# Patient Record
Sex: MALE | Race: WHITE | NOT HISPANIC OR LATINO | Employment: UNEMPLOYED | URBAN - METROPOLITAN AREA
[De-identification: names, ages, dates, MRNs, and addresses within clinical notes are randomized per-mention and may not be internally consistent; named-entity substitution may affect disease eponyms.]

---

## 2019-02-06 ENCOUNTER — OFFICE VISIT (OUTPATIENT)
Dept: PSYCHIATRY | Facility: CLINIC | Age: 59
End: 2019-02-06

## 2019-02-06 DIAGNOSIS — Z13.89 ENCOUNTER FOR SCREENING FOR OTHER DISORDER: ICD-10-CM

## 2019-02-06 DIAGNOSIS — F43.9 FEELING STRESSED OUT: ICD-10-CM

## 2019-02-06 DIAGNOSIS — G89.29 OTHER CHRONIC PAIN: Primary | ICD-10-CM

## 2019-02-06 PROCEDURE — 90791 PSYCH DIAGNOSTIC EVALUATION: CPT | Performed by: PSYCHOLOGIST

## 2019-02-07 NOTE — PROGRESS NOTES
PROGRESS NOTE    Data:  Shane Kearney is a 58 y.o. male who met with the undersigned for a scheduled individual outpatient therapy session from 2:20 - 3:15pm.      Clinical Maneuvering/Intervention:      Pt talked about struggling with chronic pain for years, having bad back pain as of late. He complained about how having to take pain medication for many years was upsetting to him and hard on his body. He is seeking to get the spinal cord stimulator (SCS) in hope that it could help alleviate pain symptoms. A psychological evaluation was conducted in order to assess past and current level of functioning. Areas assessed included, but were not limited to: perception of social support, perception of ability to face and deal with challenges in life (positive functioning), anxiety symptoms, depressive symptoms, perspective on beliefs/belief system, coping skills for stress, intelligence level, addiction issues, etc. Therapeutic rapport was established. Interventions conducted today were geared towards pain symptom management, identifying existing coping skills to use in face of difficulties, and identifying any potential counseling needs. He talked about how he lives with his son and granddaughter [place], that finances are short, but that they make ends meet the best that they can. 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 Core stressors included financial stress, having physical pain, and having concerns about his elderly parents (mother with dementia and father with late stage cancer). Coping skills were deemed strong as he has the ability to: relying on several people in his life when needed, be connected socially nearly daily, use humor in difficult situations, and pull himself out of feeling down/temporarily depressed. He talked about feeling more excited/hopeful about getting the SCS than nervous. The pt expressed gratitude for  today's session.    Mental Status Exam  Hygiene:  good  Dress: normal  Attitude:  cooperative and proactive  Motor Activity: normal  Speech: normal  Mood:  level  Affect:  congruent  Thought Processes: normal  Thought Content:  normal  Suicidal Thoughts:  not endorsed  Homicidal Thoughts:  not endorsed  Crisis Safety Plan: not needed   Hallucinations:  none      Patient's Support Network Includes:  family, friends      Progress toward goal: there is evidence to suggest that he is taking measures to improve the quality of his life including seeking pain treatment.      Functional Status: moderate      Prognosis: good    Assessment      The pt presented to be suffering from chronic pain and experiencing stress related to financial strain and having sick parents. He otherwise presents as a fairly highly functioning person with several strong coping skills for stress (as described above).    From a psychological standpoint, he is a good candidate to receive the SCS as he is motivated for the treatment, open to his pain physician’s suggestions/guidance, and will likely abide by his pain physician’s instructions.         Plan      In order to diminish symptoms of pain, the pt will follow up with his pain physician in order to receive the SCS (as soon as feasible/advisable by his physician). In order to prevent onset of depression/major depression, he is to continue taking depression medication as prescribed. Should pain symptoms diminish, he is to give thought to how he can increase his income in order to alleviate financial stress. He is to continue to lean on loved ones in order to cope with stress and seek counseling should he need/desire this in the future.    Melvina Ash, PhD, LP

## 2020-07-01 ENCOUNTER — LAB (OUTPATIENT)
Dept: LAB | Facility: HOSPITAL | Age: 60
End: 2020-07-01

## 2020-07-01 ENCOUNTER — OFFICE VISIT (OUTPATIENT)
Dept: NEUROLOGY | Facility: CLINIC | Age: 60
End: 2020-07-01

## 2020-07-01 VITALS
BODY MASS INDEX: 43.23 KG/M2 | OXYGEN SATURATION: 97 % | DIASTOLIC BLOOD PRESSURE: 78 MMHG | HEIGHT: 70 IN | WEIGHT: 302 LBS | SYSTOLIC BLOOD PRESSURE: 128 MMHG | HEART RATE: 63 BPM

## 2020-07-01 DIAGNOSIS — R73.03 PRE-DIABETES: ICD-10-CM

## 2020-07-01 DIAGNOSIS — R55 SYNCOPE AND COLLAPSE: Primary | ICD-10-CM

## 2020-07-01 LAB — HBA1C MFR BLD: 4.7 % (ref 4.8–5.6)

## 2020-07-01 PROCEDURE — 83036 HEMOGLOBIN GLYCOSYLATED A1C: CPT

## 2020-07-01 PROCEDURE — 99204 OFFICE O/P NEW MOD 45 MIN: CPT | Performed by: PSYCHIATRY & NEUROLOGY

## 2020-07-01 PROCEDURE — 36415 COLL VENOUS BLD VENIPUNCTURE: CPT

## 2020-07-01 RX ORDER — LISINOPRIL 40 MG/1
40 TABLET ORAL DAILY
COMMUNITY
Start: 2020-06-08 | End: 2020-08-25 | Stop reason: SDUPTHER

## 2020-07-01 RX ORDER — ESOMEPRAZOLE MAGNESIUM 40 MG/1
CAPSULE, DELAYED RELEASE ORAL
COMMUNITY

## 2020-07-01 RX ORDER — SERTRALINE HYDROCHLORIDE 100 MG/1
100 TABLET, FILM COATED ORAL DAILY
COMMUNITY

## 2020-07-01 RX ORDER — PREGABALIN 100 MG/1
250 CAPSULE ORAL 3 TIMES DAILY
COMMUNITY

## 2020-07-01 RX ORDER — NORTRIPTYLINE HYDROCHLORIDE 25 MG/1
25 CAPSULE ORAL DAILY
COMMUNITY

## 2020-07-01 RX ORDER — HYDROXYZINE 50 MG/1
50 TABLET, FILM COATED ORAL EVERY 6 HOURS PRN
COMMUNITY

## 2020-07-01 RX ORDER — PREGABALIN 150 MG/1
150 CAPSULE ORAL DAILY
COMMUNITY
Start: 2020-06-27 | End: 2020-08-25 | Stop reason: SDUPTHER

## 2020-07-01 RX ORDER — NAPROXEN 500 MG/1
500 TABLET ORAL DAILY
COMMUNITY

## 2020-07-01 RX ORDER — HYDROCODONE BITARTRATE AND ACETAMINOPHEN 7.5; 325 MG/1; MG/1
TABLET ORAL EVERY 6 HOURS
COMMUNITY
Start: 2020-06-22

## 2020-07-01 RX ORDER — TIZANIDINE 4 MG/1
4 TABLET ORAL DAILY
COMMUNITY

## 2020-07-01 NOTE — PROGRESS NOTES
Subjective:    CC: Shane Kearney is seen today in consultation at the request of THIERRY Reddy for syncope    HPI:  59-year-old male with a past medical history of depression, chronic back pain presents with 2 syncopal episodes.  As per patient he had his first syncopal episode 3 weeks ago.  He was out mowing his yard when he had tremulousness of the whole body with his legs feeling weak.  After that he passed out for a few seconds.  Denies any tongue bite, bowel/bladder incontinence.  The episode was witnessed by his coworkers who told him that he did not shake during the spell.  When he woke up he was slightly confused for a minute or so.  He had a second episode 3 days later again with whole body tremulousness however he sat down and did not pass out.  His third episode was 1 week ago again with mild whole body shaking/tremulousness followed by passing out for a few seconds.  This event was not witnessed by anyone.  Patient did not have any warning signs with any of the episodes.  He also gets lightheaded when standing up and walking.  Denies having any febrile seizures as a child or any family history of seizures.  He states he was previously a diabetic but went off of medications after he had a gastric bypass surgery and lost weight.  He had a CT scan of the head last month that did not show any acute intracranial abnormalities.    The following portions of the patient's history were reviewed today and updated as of 07/01/2020  : allergies, current medications, past family history, past medical history, past social history, past surgical history and problem list  These document will be scanned to patient's chart.      Current Outpatient Medications:   •  esomeprazole (nexIUM) 40 MG capsule, esomeprazole magnesium 40 mg capsule,delayed release, Disp: , Rfl:   •  HYDROcodone-acetaminophen (NORCO) 7.5-325 MG per tablet, , Disp: , Rfl:   •  hydrOXYzine (ATARAX) 50 MG tablet, Take 50 mg by mouth Daily.,  "Disp: , Rfl:   •  lisinopril (PRINIVIL,ZESTRIL) 40 MG tablet, Take 40 mg by mouth Daily., Disp: , Rfl:   •  naproxen (NAPROSYN) 500 MG tablet, Take 500 mg by mouth Daily., Disp: , Rfl:   •  nortriptyline (PAMELOR) 25 MG capsule, Take 25 mg by mouth Daily., Disp: , Rfl:   •  pregabalin (LYRICA) 150 MG capsule, Take 150 mg by mouth Daily., Disp: , Rfl:   •  sertraline (ZOLOFT) 100 MG tablet, Take 100 mg by mouth Daily., Disp: , Rfl:   •  tiZANidine (ZANAFLEX) 4 MG tablet, Take 4 mg by mouth Daily., Disp: , Rfl:   •  pregabalin (Lyrica) 100 MG capsule, Take 100 mg by mouth Daily., Disp: , Rfl:    Past Medical History:   Diagnosis Date   • Migraine    • Syncope       History reviewed. No pertinent surgical history.   History reviewed. No pertinent family history.   Social History     Socioeconomic History   • Marital status: Unknown     Spouse name: Not on file   • Number of children: Not on file   • Years of education: Not on file   • Highest education level: Not on file   Tobacco Use   • Smoking status: Never Smoker   • Smokeless tobacco: Never Used   Substance and Sexual Activity   • Alcohol use: Yes   • Drug use: Never   • Sexual activity: Defer     Review of Systems   Neurological: Positive for dizziness, tremors, syncope, weakness, numbness and headache.   All other systems reviewed and are negative.      Objective:    /78   Pulse 63   Ht 177.8 cm (70\")   Wt (!) 137 kg (302 lb)   SpO2 97%   BMI 43.33 kg/m²     Neurology Exam:    General apperance: Morbidly obese.  Orthostatics checked today were positive and showed a 20 point drop in systolic and diastolic blood pressure on standing up    Mental status: Alert, awake and oriented to time place and person.    Recent and Remote memory: Intact.    Attention span and Concentration: Normal.     Language and Speech: Intact- No dysarthria.    Fluency, Naming , Repitition and Comprehension:  Intact    Cranial Nerves:   CN II: Visual fields are full. Intact. " Fundi - Normal, No papillederma, Pupils - BRENDA  CN III, IV and VI: Extraocular movements are intact. Normal saccades.   CN V: Facial sensation is intact.   CN VII: Muscles of facial expression reveal no asymmetry. Intact.   CN VIII: Hearing is intact. Whispered voice intact.   CN IX and X: Palate elevates symmetrically. Intact  CN XI: Shoulder shrug is intact.   CN XII: Tongue is midline without evidence of atrophy or fasciculation.     Ophthalmoscopic exam of optic disc-normal    Motor:-Fine postural tremors noted in both hands    Right UE muscle strength 5/5. Normal tone.     Left UE muscle strength 5/5. Normal tone.      Right LE muscle strength5/5. Normal tone.     Left LE muscle strength 5/5. Normal tone.      Sensory: Normal light touch, vibration and pinprick sensation bilaterally.    DTRs: 2+ bilaterally in upper and lower extremities.    Babinski: Negative bilaterally.    Co-ordination: Normal finger-to-nose, heel to shin B/L.    Rhomberg: Negative.    Gait: Normal.    Cardiovascular: Regular rate and rhythm without murmur, gallop or rub.    Assessment and Plan:  1. Syncope and collapse  Patient's syncopal episodes were most likely due to orthostatic hypotension.  Orthostatics today were positive  I have asked him to monitor his blood pressure every day at home and to come off of lisinopril after discussing with his PCP if his blood pressure runs consistently low  He should also keep himself well-hydrated (something which she does not do) and start wearing above-knee moderate compression stockings for increased venous return  I will defer starting him on medications yet but if he continues to have episodes then I may start him on low doses of midodrine.  We also get a cardiac work-up if the episodes continue    2. Pre-diabetes  Will check an A1c as diabetes can predispose to autonomic dysfunction    - Hemoglobin A1c; Future       Return in about 6 weeks (around 8/12/2020).         Savanah Hawk MD

## 2020-07-02 ENCOUNTER — TELEPHONE (OUTPATIENT)
Dept: NEUROLOGY | Facility: CLINIC | Age: 60
End: 2020-07-02

## 2020-07-02 NOTE — TELEPHONE ENCOUNTER
Relayed results to  who stated verbal understanding. Also states he ordered his compression stockings last night so will see how they do. Thanks!

## 2020-07-02 NOTE — TELEPHONE ENCOUNTER
----- Message from Savanah Hawk MD sent at 7/2/2020  9:04 AM EDT -----  Please let him know that his A1c was normal in fact on the lower side.  In addition to keeping himself well-hydrated he should eat at regular intervals

## 2020-08-25 ENCOUNTER — CONSULT (OUTPATIENT)
Dept: CARDIOLOGY | Facility: CLINIC | Age: 60
End: 2020-08-25

## 2020-08-25 VITALS
OXYGEN SATURATION: 99 % | TEMPERATURE: 98.1 F | HEART RATE: 87 BPM | WEIGHT: 298 LBS | BODY MASS INDEX: 42.66 KG/M2 | HEIGHT: 70 IN | DIASTOLIC BLOOD PRESSURE: 68 MMHG | SYSTOLIC BLOOD PRESSURE: 108 MMHG

## 2020-08-25 DIAGNOSIS — R55 SYNCOPE AND COLLAPSE: Primary | ICD-10-CM

## 2020-08-25 PROCEDURE — 99204 OFFICE O/P NEW MOD 45 MIN: CPT | Performed by: INTERNAL MEDICINE

## 2020-08-25 PROCEDURE — 93000 ELECTROCARDIOGRAM COMPLETE: CPT | Performed by: INTERNAL MEDICINE

## 2020-08-25 RX ORDER — LISINOPRIL 40 MG/1
20 TABLET ORAL
Start: 2020-08-25

## 2020-08-25 NOTE — PROGRESS NOTES
[facility] at [facility]  Consultation H&P  Shane Kearney  1960    There is no work phone number on file..    VISIT DATE:  08/25/2020    PCP: Anjali Bernal PA  [address] [id number]    CC:  Chief Complaint   Patient presents with   • Loss of Consciousness     Consult    • Dizziness   • Hypertension       ASSESSMENT:   Diagnosis Plan   1. Syncope and collapse  Adult Transthoracic Echo Complete W/ Cont if Necessary Per Protocol    US Carotid Bilateral   Overall consistent with orthostatic hypotension.    PLAN:  Transthoracic echo pending to assess underlying myocardial structure and function.  Carotid duplex to evaluate for symptomatic carotid atherosclerosis.  Decreasing lisinopril to 20 mg and moving to bedtime dosing.  Discussed avoiding dehydration and taking abortive measures and being careful when getting up from a seated position.    History of Present Illness   60-year-old gentleman with history of hypertension he was recently had 5 episodes of syncope over the previous 4 to 6 weeks.  Most of these episodes occurred after standing up from a seated position.  Would have rapid onset lightheadedness eventually into a short loss of consciousness.  Has experienced soft tissue injuries with these falls.  No associated palpitations or chest discomfort.  No loss of bowel or bladder function.  No recent medication changes.  Weight is been stable.  Reports his blood pressures are often running less than 120/75 mmHg at home.  He is compliant with medical therapy.  Takes his lisinopril in the morning.  Recent laboratory evaluation remarkable for hemoglobin of 9 g/dL, creatinine 1.5.  Was previously evaluated by neurology with positive orthostatic blood pressures.  Low risk 12 EKG today.    PHYSICAL EXAMINATION:  Vitals:    08/25/20 1351   BP: 108/68   BP Location: Right arm   Patient Position: Sitting   Pulse: 87   Temp: 98.1 °F (36.7 °C)   SpO2: 99%   Weight: 135 kg (298 lb)   Height:  "177.8 cm (70\")     General Appearance:    Alert, cooperative, no distress, appears stated age   Head:    Normocephalic, without obvious abnormality, atraumatic   Eyes:    conjunctiva/corneas clear, EOM's intact, fundi     benign, both eyes   Ears:    Normal TM's and external ear canals, both ears   Nose:   Nares normal, septum midline, mucosa normal, no drainage    or sinus tenderness   Throat:   Lips, mucosa, and tongue normal; teeth and gums normal   Neck:   Supple, symmetrical, trachea midline, no adenopathy;     thyroid:  no enlargement/tenderness/nodules; no carotid    bruit or JVD   Back:     Symmetric, no curvature, ROM normal, no CVA tenderness   Lungs:     Clear to auscultation bilaterally, respirations unlabored   Chest Wall:    No tenderness or deformity    Heart:    Regular rate and rhythm, S1 and S2 normal, no murmur, rub   or gallop, normal carotid impulse bilaterally without bruit.   Abdomen:     Soft, non-tender, bowel sounds active all four quadrants,     no masses, no organomegaly   Extremities:   Extremities normal, atraumatic, no cyanosis or edema   Pulses:   2+ and symmetric all extremities   Skin:   Skin color, texture, turgor normal, no rashes or lesions   Lymph nodes:   Cervical, supraclavicular, and axillary nodes normal   Neurologic:   normal strength, sensation intact     throughout       Diagnostic Data:    ECG 12 Lead  Date/Time: 8/25/2020 2:10 PM  Performed by: Royer Rollins III, MD  Authorized by: Royer Rollins III, MD   Previous ECG: no previous ECG available  Rhythm: sinus rhythm  Other findings: low voltage    Clinical impression: non-specific ECG          No results found for: CHLPL, TRIG, HDL, LDLDIRECT  No results found for: GLUCOSE, BUN, CREATININE, NA, K, CL, CO2, CREATININE, BUN  Lab Results   Component Value Date    HGBA1C 4.70 (L) 07/01/2020     No results found for: WBC, HGB, HCT, PLT    PROBLEM LIST:  Patient Active Problem List   Diagnosis   • Pre-diabetes   • Syncope and " collapse       PAST MEDICAL HX  Past Medical History:   Diagnosis Date   • Anxiety    • Arthritis    • Depression    • Easy bruising    • Migraine    • Syncope        Allergies  No Known Allergies    Current Medications    Current Outpatient Medications:   •  esomeprazole (nexIUM) 40 MG capsule, esomeprazole magnesium 40 mg capsule,delayed release, Disp: , Rfl:   •  HYDROcodone-acetaminophen (NORCO) 7.5-325 MG per tablet, Every 6 (Six) Hours., Disp: , Rfl:   •  hydrOXYzine (ATARAX) 50 MG tablet, Take 50 mg by mouth Every 6 (Six) Hours As Needed., Disp: , Rfl:   •  lisinopril (PRINIVIL,ZESTRIL) 40 MG tablet, Take 0.5 tablets by mouth every night at bedtime., Disp: , Rfl:   •  naproxen (NAPROSYN) 500 MG tablet, Take 500 mg by mouth Daily., Disp: , Rfl:   •  nortriptyline (PAMELOR) 25 MG capsule, Take 25 mg by mouth Daily., Disp: , Rfl:   •  pregabalin (Lyrica) 100 MG capsule, Take 250 mg by mouth 3 (Three) Times a Day., Disp: , Rfl:   •  sertraline (ZOLOFT) 100 MG tablet, Take 100 mg by mouth Daily., Disp: , Rfl:   •  tiZANidine (ZANAFLEX) 4 MG tablet, Take 4 mg by mouth Daily., Disp: , Rfl:          ROS  Review of Systems   Constitution: Positive for malaise/fatigue.   Cardiovascular: Positive for syncope.   Respiratory: Positive for sleep disturbances due to breathing.    Musculoskeletal: Positive for arthritis, back pain and joint pain.   Neurological: Positive for dizziness.       All other body systems reviewed and are negative    SOCIAL HX  Social History     Socioeconomic History   • Marital status: Unknown     Spouse name: Not on file   • Number of children: Not on file   • Years of education: Not on file   • Highest education level: Not on file   Tobacco Use   • Smoking status: Never Smoker   • Smokeless tobacco: Never Used   Substance and Sexual Activity   • Alcohol use: Yes   • Drug use: Never   • Sexual activity: Defer       FAMILY HX  Family History   Problem Relation Age of Onset   • No Known Problems  Mother    • Kidney cancer Father    • No Known Problems Brother    • No Known Problems Brother    • No Known Problems Brother    • No Known Problems Brother              Royer Rollins III, MD, FACC

## 2020-08-26 DIAGNOSIS — R55 SYNCOPE AND COLLAPSE: Primary | ICD-10-CM

## 2021-04-19 RX ORDER — NYSTATIN 100000 U/G
CREAM TOPICAL
Qty: 480 G | OUTPATIENT
Start: 2021-04-19

## 2021-06-02 RX ORDER — NYSTATIN 100000 U/G
CREAM TOPICAL
Qty: 480 G | Refills: 2 | Status: SHIPPED | OUTPATIENT
Start: 2021-06-02